# Patient Record
Sex: FEMALE | URBAN - METROPOLITAN AREA
[De-identification: names, ages, dates, MRNs, and addresses within clinical notes are randomized per-mention and may not be internally consistent; named-entity substitution may affect disease eponyms.]

---

## 2022-01-07 ENCOUNTER — OFFICE VISIT (OUTPATIENT)
Dept: URBAN - METROPOLITAN AREA CLINIC 41 | Facility: CLINIC | Age: 62
End: 2022-01-07
Payer: COMMERCIAL

## 2022-01-07 DIAGNOSIS — H40.9 GLAUCOMA: ICD-10-CM

## 2022-01-07 DIAGNOSIS — H25.13 AGE-RELATED NUCLEAR CATARACT, BILATERAL: ICD-10-CM

## 2022-01-07 PROCEDURE — 92134 CPTRZ OPH DX IMG PST SGM RTA: CPT | Performed by: OPHTHALMOLOGY

## 2022-01-07 PROCEDURE — 99204 OFFICE O/P NEW MOD 45 MIN: CPT | Performed by: OPHTHALMOLOGY

## 2022-01-07 ASSESSMENT — INTRAOCULAR PRESSURE
OD: 12
OS: 13

## 2022-01-07 NOTE — IMPRESSION/PLAN
Impression: Age-related nuclear cataract, bilateral: H25.13. Plan: Patient understands progression with PPV.

## 2022-01-07 NOTE — IMPRESSION/PLAN
Impression: Glaucoma Suspect: H40.9. Plan: Exam demonstrates glaucomatous cupping. Fortunately, there is no NVI today. IOP is WNL. Discussed surgery vs laser vs gtts vs observation. Recommend CPM (Follows with Dr. Khai Naik and Dr. Concepcion Olivares. She has had a full work up for MS which was WNL. There is a strong FH of RP, and fortunately, no evidence of this for ARENDAL.

## 2022-01-07 NOTE — IMPRESSION/PLAN
Impression: Other vitreous opacities, right eye: H43.391. Plan: Exam and OCT demonstrates vitreous opacities OD>OS that are hindering the patient's ADLs. The risks and benefits of PPV versus observation were discussed at length. Given the patient's current visual acuity and hindrance on activities of daily living, the patient elects to proceed with surgery. However, she will likely only go through this ~ April or may.  

3-4 mo with OCT OU

## 2022-04-06 ENCOUNTER — OFFICE VISIT (OUTPATIENT)
Dept: URBAN - METROPOLITAN AREA CLINIC 41 | Facility: CLINIC | Age: 62
End: 2022-04-06
Payer: MEDICARE

## 2022-04-06 DIAGNOSIS — H43.391 OTHER VITREOUS OPACITIES, RIGHT EYE: Primary | ICD-10-CM

## 2022-04-06 PROCEDURE — 99214 OFFICE O/P EST MOD 30 MIN: CPT | Performed by: OPHTHALMOLOGY

## 2022-04-06 PROCEDURE — 92134 CPTRZ OPH DX IMG PST SGM RTA: CPT | Performed by: OPHTHALMOLOGY

## 2022-04-06 ASSESSMENT — INTRAOCULAR PRESSURE
OD: 10
OS: 13

## 2022-04-06 NOTE — IMPRESSION/PLAN
Impression: Other vitreous opacities, right eye: H43.391. Plan: Exam and OCT demonstrates vitreous opacities OD>OS that are hindering the patient's ADLs. The risks and benefits of PPV versus observation were discussed at length. Fortunately, floaters have improved. Given the patient's improvement, recommend observation. Patient notes whiteout events that may be related to ocular migraine as it does not sound to be related to floaters or amaurosis events.  

6 mo with OCT OU

## 2022-04-06 NOTE — IMPRESSION/PLAN
Impression: Glaucoma Suspect: H40.9. Plan: Exam demonstrates glaucomatous cupping. Fortunately, there is no NVI today. IOP is WNL. Discussed surgery vs laser vs gtts vs observation. Recommend CPM (Follows with Dr. Anirudh Hamlin and Dr. Cas Sandoval. She has had a full work up for MS which was WNL. There is a strong FH of RP, and fortunately, no evidence of this for ARENDAL.